# Patient Record
Sex: MALE | Race: OTHER
[De-identification: names, ages, dates, MRNs, and addresses within clinical notes are randomized per-mention and may not be internally consistent; named-entity substitution may affect disease eponyms.]

---

## 2020-02-03 ENCOUNTER — HOSPITAL ENCOUNTER (EMERGENCY)
Dept: HOSPITAL 56 - MW.ED | Age: 30
LOS: 1 days | Discharge: HOME | End: 2020-02-04
Payer: SELF-PAY

## 2020-02-03 DIAGNOSIS — R19.7: ICD-10-CM

## 2020-02-03 DIAGNOSIS — F17.210: ICD-10-CM

## 2020-02-03 DIAGNOSIS — Z91.013: ICD-10-CM

## 2020-02-03 DIAGNOSIS — R11.0: Primary | ICD-10-CM

## 2020-02-03 PROCEDURE — 81003 URINALYSIS AUTO W/O SCOPE: CPT

## 2020-02-03 PROCEDURE — 99284 EMERGENCY DEPT VISIT MOD MDM: CPT

## 2020-02-03 NOTE — EDM.PDOC
ED HPI GENERAL MEDICAL PROBLEM





- General


Chief Complaint: Abdominal Pain


Stated Complaint: ABDOMINAL PAIN


Time Seen by Provider: 02/03/20 23:31





- History of Present Illness


INITIAL COMMENTS - FREE TEXT/NARRATIVE: 


HISTORY AND PHYSICAL:





History of present illness:


Patient is a 29-year-old male with no GI or abdominal surgical history who 

presents with complaints of nausea and diarrhea that started over the weekend 

and has persisted until today.  He says that he ate chicken at a friend's house 

and felt a little upset stomach before going to sleep and the next day woke up 

and did not feel well.  He says that he normally only has 1 bowel movement per 

week and it usually hard although he has not taken any stool softeners or over-

the-counter preps to improve his fiber and his diet he says is variable and he 

does eat junk foods.  When he had a loose stool at the onset of the symptoms he 

was surprised as it was watery and that is never happened before.  Since that 

time he has had several watery stools that are not black or bloody and he has 

diffuse vague abdominal pain which does not localize right or left.  He says 

that sometimes he will have spasms of pain in the left lower quadrant.  He has 

nausea without vomiting no fevers or flank pain no urinary complaints and no 

upper respiratory symptoms.  He has not tried anything over-the-counter for 

this.





Review of systems: 


As per history of present illness and below otherwise all systems reviewed and 

negative.





Past medical history: 


As per history of present illness and as reviewed below otherwise 

noncontributory.





Surgical history: 


As per history of present illness and as reviewed below otherwise 

noncontributory.





Social history: 


No reported history of drug or alcohol abuse.





Family history: 


As per history of present illness and as reviewed below otherwise 

noncontributory.





Physical exam:


Well-developed well-nourished man who is nontoxic and moves easily in the ED.  

Vital signs are noted by me


HEENT: Atraumatic, normocephalic, pupils reactive, negative for conjunctival 

pallor or scleral icterus, mucous membranes moist, throat clear, neck supple, 

nontender, trachea midline.


Lungs: Clear to auscultation, breath sounds equal bilaterally, chest nontender.


Heart: S1S2, regular, negative for clicks, rubs, or JVD.


Abdomen: Soft, nondistended,.  Bowel sounds are hypoactive and there is some 

minimal tenderness in the left lower quadrant without rebound or guarding and 

there is no tympany on percussion.  Negative for masses or hepatosplenomegaly. 

Negative for costovertebral tenderness.


Pelvis: Stable nontender.


Genitourinary: Deferred.


Rectal: Deferred.


Extremities: Atraumatic, negative for cords or calf pain. Neurovascular 

unremarkable.


Neuro: Awake, alert, oriented. Cranial nerves II through XII unremarkable. 

Cerebellum unremarkable. Motor and sensory unremarkable throughout. Exam 

nonfocal.





Diagnostics:


CBC CMP lactic acid abdominal x-rays UA with reflex--- patient refused all of 

this





Therapeutics:


IV fluids Zofran Toradol  hyoscamine





My evaluation the patient has told nursing that he does not want any lab 

evaluation x-rays or IV medications.  We will give him a dose of Zofran and 

Levsin and a prescription for some for home and have advised follow-up in the 

clinic.


Impression: 


Nausea and diarrhea with abdominal pain





Definitive disposition and diagnosis as appropriate pending reevaluation and 

review of above.





- Related Data


 Allergies











Allergy/AdvReac Type Severity Reaction Status Date / Time


 


shrimp Allergy  Hives Verified 02/03/20 23:32











Home Meds: 


 Home Meds





oxyCODONE 5 mg PO ASDIRECTED PRN 02/03/20 [History]











Past Medical History


Cardiovascular History: Reports: Heart Murmur


Musculoskeletal History: Reports: Other (See Below)


Other Musculoskeletal History: fx R foot





- Past Surgical History


Cardiovascular Surgical History: Reports: None


Musculoskeletal Surgical History: Reports: None





Social & Family History





- Family History


Family Medical History: Noncontributory





- Tobacco Use


Smoking Status *Q: Current Every Day Smoker


Years of Tobacco use: 3


Packs/Tins Daily: 0.3





- Caffeine Use


Caffeine Use: Reports: Energy Drinks





- Recreational Drug Use


Recreational Drug Use: No





ED ROS GENERAL





- Review of Systems


Review Of Systems: Comprehensive ROS is negative, except as noted in HPI.





ED EXAM, GENERAL





- Physical Exam


Exam: See Below (See dictation)





Course





- Vital Signs


Last Recorded V/S: 


 Last Vital Signs











Temp  36.4 C   02/03/20 23:25


 


Pulse  84   02/03/20 23:25


 


Resp  17   02/03/20 23:25


 


BP  142/92 H  02/03/20 23:25


 


Pulse Ox  97   02/03/20 23:25














- Orders/Labs/Meds


Orders: 


 Active Orders 24 hr











 Category Date Time Status


 


 Abdomen 2V AP Flat Upright [CR] Stat Exams  02/03/20 23:46 Ordered


 


 CBC WITH AUTO DIFF [HEME] Stat Lab  02/03/20 23:45 Ordered


 


 COMPREHENSIVE METABOLIC PN,CMP [CHEM] Stat Lab  02/03/20 23:45 Ordered


 


 LACTATE WITH REFLEX [BG] Stat Lab  02/03/20 23:45 Ordered


 


 UA RFX ARI AND CULT IF INDIC [URIN] Stat Lab  02/03/20 23:37 Received


 


 Sodium Chloride 0.9% [Normal Saline] 1,000 ml Med  02/03/20 23:46 Active





 IV STAT   


 


 Sodium Chloride 0.9% [Saline Flush] Med  02/03/20 23:46 Active





 10 ml FLUSH ASDIRECTED PRN   


 


 Sodium Chloride 0.9% [Saline Flush] Med  02/03/20 23:46 Active





 2.5 ml FLUSH ASDIRECTED PRN   


 


 Saline Lock Insert [OM.PC] Stat Oth  02/03/20 23:45 Ordered








 Medication Orders





Sodium Chloride (Normal Saline)  1,000 mls @ 999 mls/hr IV STAT ONE


   Stop: 02/04/20 00:46


Sodium Chloride (Saline Flush)  10 ml FLUSH ASDIRECTED PRN


   PRN Reason: Keep Vein Open


Sodium Chloride (Saline Flush)  2.5 ml FLUSH ASDIRECTED PRN


   PRN Reason: Keep Vein Open








Meds: 


Medications











Generic Name Dose Route Start Last Admin





  Trade Name Freq  PRN Reason Stop Dose Admin


 


Sodium Chloride  1,000 mls @ 999 mls/hr  02/03/20 23:46  





  Normal Saline  IV  02/04/20 00:46  





  STAT ONE   





     





     





     





     


 


Sodium Chloride  10 ml  02/03/20 23:46  





  Saline Flush  FLUSH   





  ASDIRECTED PRN   





  Keep Vein Open   





     





     





     


 


Sodium Chloride  2.5 ml  02/03/20 23:46  





  Saline Flush  FLUSH   





  ASDIRECTED PRN   





  Keep Vein Open   





     





     





     














Discontinued Medications














Generic Name Dose Route Start Last Admin





  Trade Name Freq  PRN Reason Stop Dose Admin


 


Hyoscyamine  0.125 mg  02/03/20 23:46  





  Hyomax-Sl  SL  02/03/20 23:47  





  ONETIME ONE   





     





     





     





     


 


Ketorolac Tromethamine  30 mg  02/03/20 23:46  





  Toradol  IVPUSH  02/03/20 23:47  





  ONETIME ONE   





     





     





     





     


 


Ondansetron HCl  4 mg  02/03/20 23:46  





  Zofran  IVPUSH  02/03/20 23:47  





  ONETIME ONE   





     





     





     





     














Departure





- Departure


Time of Disposition: 00:02


Disposition: Home, Self-Care 01


Condition: Good


Clinical Impression: 


 Nausea





Diarrhea


Qualifiers:


 Diarrhea type: unspecified type Qualified Code(s): R19.7 - Diarrhea, 

unspecified








- Discharge Information


Referrals: 


PCP,None [Primary Care Provider] - 


Forms:  ED Department Discharge


Additional Instructions: 


The following information is given to patients seen in the emergency department 

who are being discharged to home. This information is to outline your options 

for follow-up care. We provide all patients seen in our emergency department 

with a follow-up referral.





The need for follow-up, as well as the timing and circumstances, are variable 

depending upon the specifics of your emergency department visit.





If you don't have a primary care physician on staff, we will provide you with a 

referral. We always advise you to contact your personal physician following an 

emergency department visit to inform them of the circumstance of the visit and 

for follow-up with them and/or the need for any referrals to a consulting 

specialist.





The emergency department will also refer you to a specialist when appropriate. 

This referral assures that you have the opportunity for followup care with a 

specialist. All of these measure are taken in an effort to provide you with 

optimal care, which includes your followup.





Under all circumstances we always encourage you to contact your private 

physician who remains a resource for coordinating  your care. When calling for 

followup care, please make the office aware that this follow-up is from your 

recent emergency room visit. If for any reason you are refused follow-up, 

please contact the [facility] emergency 

department at [phone] and ask to speak to the emergency department 

charge nurse.





[facility] 


Primary care- Internal Medicine and Family [address] [address]


[phone]





Duration and use medications as prescribed and needed.  Please follow-up in the 

clinic and return to ER as needed and as discussed.  Push hydration and bland 

diet.








Sepsis Event Note





- Evaluation


Sepsis Screening Result: No Definite Risk





- Focused Exam


Vital Signs: 


 Vital Signs











  Temp Pulse Resp BP Pulse Ox


 


 02/03/20 23:25  36.4 C  84  17  142/92 H  97











Date Exam was Performed: 02/03/20


Time Exam was Performed: 23:59





- My Orders


Last 24 Hours: 


My Active Orders





02/03/20 23:37


UA RFX ARI AND CULT IF INDIC [URIN] Stat 





02/03/20 23:45


CBC WITH AUTO DIFF [HEME] Stat 


COMPREHENSIVE METABOLIC PN,CMP [CHEM] Stat 


LACTATE WITH REFLEX [BG] Stat 


Saline Lock Insert [OM.PC] Stat 





02/03/20 23:46


Abdomen 2V AP Flat Upright [CR] Stat 


Sodium Chloride 0.9% [Normal Saline] 1,000 ml IV STAT 


Sodium Chloride 0.9% [Saline Flush]   10 ml FLUSH ASDIRECTED PRN 


Sodium Chloride 0.9% [Saline Flush]   2.5 ml FLUSH ASDIRECTED PRN 














- Assessment/Plan


Last 24 Hours: 


My Active Orders





02/03/20 23:37


UA RFX ARI AND CULT IF INDIC [URIN] Stat 





02/03/20 23:45


CBC WITH AUTO DIFF [HEME] Stat 


COMPREHENSIVE METABOLIC PN,CMP [CHEM] Stat 


LACTATE WITH REFLEX [BG] Stat 


Saline Lock Insert [OM.PC] Stat 





02/03/20 23:46


Abdomen 2V AP Flat Upright [CR] Stat 


Sodium Chloride 0.9% [Normal Saline] 1,000 ml IV STAT 


Sodium Chloride 0.9% [Saline Flush]   10 ml FLUSH ASDIRECTED PRN 


Sodium Chloride 0.9% [Saline Flush]   2.5 ml FLUSH ASDIRECTED PRN